# Patient Record
Sex: MALE | Race: WHITE | NOT HISPANIC OR LATINO | URBAN - METROPOLITAN AREA
[De-identification: names, ages, dates, MRNs, and addresses within clinical notes are randomized per-mention and may not be internally consistent; named-entity substitution may affect disease eponyms.]

---

## 2020-05-13 ENCOUNTER — EMERGENCY (EMERGENCY)
Facility: HOSPITAL | Age: 35
LOS: 0 days | Discharge: HOME | End: 2020-05-13
Attending: EMERGENCY MEDICINE | Admitting: EMERGENCY MEDICINE
Payer: OTHER MISCELLANEOUS

## 2020-05-13 VITALS
DIASTOLIC BLOOD PRESSURE: 80 MMHG | HEART RATE: 100 BPM | RESPIRATION RATE: 18 BRPM | SYSTOLIC BLOOD PRESSURE: 142 MMHG | OXYGEN SATURATION: 100 % | TEMPERATURE: 98 F
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
## 2020-05-13 DIAGNOSIS — S82.892A OTHER FRACTURE OF LEFT LOWER LEG, INITIAL ENCOUNTER FOR CLOSED FRACTURE: ICD-10-CM
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
## 2020-05-13 DIAGNOSIS — S92.352A DISPLACED FRACTURE OF FIFTH METATARSAL BONE, LEFT FOOT, INITIAL ENCOUNTER FOR CLOSED FRACTURE: ICD-10-CM

## 2020-05-13 DIAGNOSIS — M25.579 PAIN IN UNSPECIFIED ANKLE AND JOINTS OF UNSPECIFIED FOOT: ICD-10-CM

## 2020-05-13 PROCEDURE — 99284 EMERGENCY DEPT VISIT MOD MDM: CPT | Mod: 25

## 2020-05-13 PROCEDURE — 73610 X-RAY EXAM OF ANKLE: CPT | Mod: 26,LT

## 2020-05-13 PROCEDURE — 29125 APPL SHORT ARM SPLINT STATIC: CPT

## 2020-05-13 PROCEDURE — 73630 X-RAY EXAM OF FOOT: CPT | Mod: 26,LT

## 2020-05-13 RX ORDER — IBUPROFEN 200 MG
600 TABLET ORAL ONCE
Refills: 0 | Status: COMPLETED | OUTPATIENT
Start: 2020-05-13 | End: 2020-05-13

## 2020-05-13 RX ADMIN — Medication 600 MILLIGRAM(S): at 10:47

## 2020-05-13 NOTE — ED PROVIDER NOTE - OBJECTIVE STATEMENT
34 year old male no past medical history p/w l;eft ankle injury. Pain left outer ankle and foot, non-radiating, unable to ambulate moderate, worse with wt bearing. better with rest. 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 no head truama or other injury. Denies erythema, ecchymosis, numbness, weakness, or paresthesia

## 2020-05-13 NOTE — ED PROVIDER NOTE - ATTENDING CONTRIBUTION TO CARE
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 denies fever, chills, n/v, numbness/tingling, decreased sensation, hearing a click or feeling a pop, knee pain, hip pain, lacerations, abrasions, ecchymoses  Vital Signs: I have reviewed the initial vital signs. Constitutional: WDWN in nad. Sitting on stretcher in nad.  Integumentary: No rash. No lacerations, abrasions, ecchymoses. (+) L ankle swelling. Cardiovascular: DP and PT pulses 2/4 b/l. Musculoskeletal: ROM of L knee in flexion, extension, Decreased ROM of L ankle in inversion and eversion, pain worse with inversion. Able to plantar and dorsi flex. Pain to palpation to L lateral ankle, (-) edema, no calf pain/swelling/erythema. No pain to palpation to knee, or hips, no pain to palpation to patella, fibular head, of base of th metatarsals. (-) Anterior and Posterior drawer tests. (-) Mccormick test. No edema, no calf pain/swelling/erythema. Neurologic: AAOx3, motor 5/5 and sensation intact throughout upper and lowe ext, pt able to ambualte but with pain. 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 denies fever, chills, n/v, numbness/tingling, decreased sensation, knee pain, hip pain, lacerations, abrasions, head trauma, or loc.  no seizure like activity.    Vital Signs: I have reviewed the initial vital signs. Constitutional: WDWN in nad. Sitting on stretcher.  Integumentary: No rash. No lacerations, abrasions, ecchymoses. (+) L medial and lateral ankle swelling, worse to lateral mallelous. Cardiovascular: DP and PT pulses 2/4 b/l. Musculoskeletal: ROM of L knee in flexion, extension, Decreased ROM of L ankle in inversion and eversion, pain worse with inversion. Able to plantar and dorsi flex. Pain to palpation to L lateral ankle and bse of L 5th metatarsal, (-) edema, no calf pain/swelling/erythema. No pain to palpation to knee, or hips, no pain to palpation to patella, fibular head, Neurologic: AAOx3, motor 5/5 and sensation intact throughout upper and lower ext.

## 2020-05-13 NOTE — ED PROVIDER NOTE - CARE PLAN
Assessment and plan of treatment:	Plan: Pain control, L ankle and foot xrays, reassess. Principal Discharge DX:	Ankle fracture, left  Assessment and plan of treatment:	Plan: Pain control, L ankle and foot xrays, reassess.  Secondary Diagnosis:	Metatarsal fracture

## 2020-05-13 NOTE — ED PROVIDER NOTE - PROGRESS NOTE DETAILS
pt placed in splint, neurovascular intact, aware of proper splint care, use of crutches, signs and symptoms to return for, follow up with ortho.

## 2020-05-13 NOTE — ED PROVIDER NOTE - NS ED ROS FT
Review of Systems         Constitutional: (-) fever (-) chills (-) weakness       Head: (-) trauma       EENT: (-) visual changes (-) sore throat (-) congestion       Cardiovascular: (-) chest pain (-) syncope       Respiratory: (-) cough, (-) shortness of breath       Gastrointestinal: (-) abdominal pain (-) vomiting (-) diarrhea (-) nausea (-) constipation       Genitourinary: (-) dysuria (-) frequency (-) hematuria       Musculoskeletal: (-) neck pain (-) back pain (+) joint pain       Integumentary: (-) rash       Neurological: (-) headache (-) altered mental status (-) dizziness (-) paresthesias       Allergic/Immunologic: (-) pruritus       Psych: (-) psych history

## 2020-05-13 NOTE — ED PROVIDER NOTE - PHYSICAL EXAMINATION
Physical Exam    Vital Signs: I have reviewed the initial vital signs  Constitutional: well-nourished, appears stated age, no acute distress  EENT: Conjunctiva pink, Sclera clear, PERRLA, EOMI. Mucous membranes moist, no exudates or lesions noted, uvula midline.  Cardiovascular: S1 and S2 present, regular rate, regular rhythm. Well perfused extremities, no peripheral edema  Respiratory: unlabored respiratory effort, clear to auscultation bilaterally no wheezing, rales or rhonchi  Musculoskeletal: supple nontender neck, no midline tenderness, no joint pain. Left Foot: Dp 2 +, sensation intact throughout foot, no knee or tibial pain. obvious lateral aspect of ankle deformity with ttp over lateral mall and 5th metatarsal. minimal plantar dorsiflexion d/t pain but achilles intact.  Integumentary: warm, dry, no rash  Psychiatric: appropriate mood, appropriate affect

## 2020-05-13 NOTE — ED PROVIDER NOTE - CLINICAL SUMMARY MEDICAL DECISION MAKING FREE TEXT BOX
pt aware of fracture, splint care, proper use of crutches, using in ed, signs and symptoms to return for, follow up with ortho, reports will follow up.

## 2020-05-13 NOTE — ED PROVIDER NOTE - NSFOLLOWUPCLINICS_GEN_ALL_ED_FT
[facility]  Orthpedic  [address], [place]   Phone: [phone]  Fax:   Follow Up Time:

## 2020-05-13 NOTE — ED PROVIDER NOTE - CARE PROVIDER_API CALL
Cesar Gardiner)  Orthopaedic Surgery  [address] [place] [address]  Phone: [phone]  Fax: [phone]  Follow Up Time:     Peter Carvajal)  Orthopaedic Surgery  [address] [place] [id number]  Phone: [phone]  Fax: [phone]  Follow Up Time:

## 2020-05-13 NOTE — ED ADULT NURSE NOTE - OBJECTIVE STATEMENT
pt c.o. 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

## 2020-05-13 NOTE — ED PROVIDER NOTE - NSFOLLOWUPINSTRUCTIONS_ED_ALL_ED_FT
-Follow up with your Primary Care Provider in 1-3 days  -RICE (rest, ice compression, elevation) for your injuries; see below  -Take 400-800 mg of Ibuprofen every 6-8 hours as needed for pain with food; food first, then medicine  -Return to ED for worsening symptoms or concerns.    RICE for Routine Care of Injuries  The routine care of many injuries includes rest, ice, compression, and elevation (RICE therapy). RICE therapy is often recommended for injuries to soft tissues, such as a muscle strain, ligament injuries, bruises, and overuse injuries. It can also be used for some bony injuries. Using RICE therapy can help to relieve pain, lessen swelling, and enable your body to heal.    Rest  Rest is required to allow your body to heal. This usually involves reducing your normal activities and avoiding use of the injured part of your body. Generally, you can return to your normal activities when you are comfortable and have been given permission by your health care provider.    Ice  Image   Icing your injury helps to keep the swelling down, and it lessens pain. Do not apply ice directly to your skin.    Put ice in a plastic bag.  Place a towel between your skin and the bag.  Leave the ice on for 20 minutes, 2–3 times a day.    Do this for as long as you are directed by your health care provider.    Compression  Compression means putting pressure on the injured area. Compression helps to keep swelling down, gives support, and helps with discomfort. Compression may be done with an elastic bandage. If an elastic bandage has been applied, follow these general tips:    Remove and reapply the bandage every 3–4 hours or as directed by your health care provider.  Make sure the bandage is not wrapped too tightly, because this can cut off circulation. If part of your body beyond the bandage becomes blue, numb, cold, swollen, or more painful, your bandage is most likely too tight. If this occurs, remove your bandage and reapply it more loosely.  See your health care provider if the bandage seems to be making your problems worse rather than better.    Elevation  Elevation means keeping the injured area raised. This helps to lessen swelling and decrease pain. If possible, your injured area should be elevated at or above the level of your heart or the center of your chest.    When should I seek medical care?  If your pain and swelling continue.  If your symptoms are getting worse rather than improving.  These symptoms may indicate that further evaluation or further X-rays are needed. Sometimes, X-rays may not show a small broken bone (fracture) until a number of days later. Make a follow-up appointment with your health care provider.    When should I seek immediate medical care?  If you have sudden severe pain at or below the area of your injury.  If you have redness or increased swelling around your injury.  If you have tingling or numbness at or below the area of your injury that does not improve after you     Fracture    A fracture is a break in one of your bones. This can occur from a variety of injuries, especially traumatic ones. Symptoms include pain, bruising, or swelling. Do not use the injured limb. If a fracture is in one of the bones below your waist, do not put weight on that limb unless instructed to do so by your healthcare provider. Crutches or a cane may have been provided. A splint or cast may have been applied by your health care provider. Make sure to keep it dry and follow up with an orthopedist as instructed.    SEEK IMMEDIATE MEDICAL CARE IF YOU HAVE ANY OF THE FOLLOWING SYMPTOMS: numbness, tingling, increasing pain, or weakness in any part of the injured limb.

## 2020-05-13 NOTE — ED PROVIDER NOTE - PATIENT PORTAL LINK FT
You can access the FollowMyHealth Patient Portal offered by [facility] by registering at the following website: [email or web address]. By joining [employer]’s FollowMyHealth portal, you will also be able to view your health information using other applications (apps) compatible with our system.

## 2023-11-25 ENCOUNTER — APPOINTMENT (OUTPATIENT)
Dept: MRI IMAGING | Facility: CLINIC | Age: 38
End: 2023-11-25

## 2023-11-25 ENCOUNTER — APPOINTMENT (OUTPATIENT)
Dept: ORTHOPEDIC SURGERY | Facility: CLINIC | Age: 38
End: 2023-11-25
Payer: OTHER MISCELLANEOUS

## 2023-11-25 VITALS — BODY MASS INDEX: 34.36 KG/M2 | WEIGHT: 240 LBS | HEIGHT: 70 IN

## 2023-11-25 DIAGNOSIS — S33.5XXA SPRAIN OF LIGAMENTS OF LUMBAR SPINE, INITIAL ENCOUNTER: ICD-10-CM

## 2023-11-25 DIAGNOSIS — S43.422A SPRAIN OF LEFT ROTATOR CUFF CAPSULE, INITIAL ENCOUNTER: ICD-10-CM

## 2023-11-25 DIAGNOSIS — S50.02XA CONTUSION OF LEFT ELBOW, INITIAL ENCOUNTER: ICD-10-CM

## 2023-11-25 DIAGNOSIS — Z78.9 OTHER SPECIFIED HEALTH STATUS: ICD-10-CM

## 2023-11-25 PROBLEM — Z00.00 ENCOUNTER FOR PREVENTIVE HEALTH EXAMINATION: Status: ACTIVE | Noted: 2023-11-25

## 2023-11-25 PROCEDURE — 99204 OFFICE O/P NEW MOD 45 MIN: CPT | Mod: ACP

## 2023-11-25 PROCEDURE — 73080 X-RAY EXAM OF ELBOW: CPT | Mod: LT

## 2023-11-25 PROCEDURE — 73030 X-RAY EXAM OF SHOULDER: CPT | Mod: LT

## 2023-11-25 RX ORDER — METHOCARBAMOL 750 MG/1
750 TABLET, FILM COATED ORAL
Qty: 60 | Refills: 0 | Status: ACTIVE | COMMUNITY
Start: 2023-11-25 | End: 1900-01-01

## 2023-11-27 ENCOUNTER — APPOINTMENT (OUTPATIENT)
Dept: MRI IMAGING | Facility: CLINIC | Age: 38
End: 2023-11-27
Payer: OTHER MISCELLANEOUS

## 2023-11-27 PROCEDURE — 73221 MRI JOINT UPR EXTREM W/O DYE: CPT | Mod: LT

## 2023-11-30 ENCOUNTER — APPOINTMENT (OUTPATIENT)
Dept: ORTHOPEDIC SURGERY | Facility: CLINIC | Age: 38
End: 2023-11-30
Payer: OTHER MISCELLANEOUS

## 2023-11-30 VITALS — BODY MASS INDEX: 34.36 KG/M2 | HEIGHT: 70 IN | WEIGHT: 240 LBS

## 2023-11-30 PROCEDURE — J3490M: CUSTOM

## 2023-11-30 PROCEDURE — 20611 DRAIN/INJ JOINT/BURSA W/US: CPT | Mod: LT

## 2023-11-30 PROCEDURE — 99214 OFFICE O/P EST MOD 30 MIN: CPT | Mod: 25

## 2023-11-30 RX ORDER — METHYLPREDNISOLONE 4 MG/1
4 TABLET ORAL
Qty: 1 | Refills: 0 | Status: ACTIVE | COMMUNITY
Start: 2023-11-30 | End: 1900-01-01

## 2024-01-02 ENCOUNTER — RX RENEWAL (OUTPATIENT)
Age: 39
End: 2024-01-02

## 2024-01-12 ENCOUNTER — NON-APPOINTMENT (OUTPATIENT)
Age: 39
End: 2024-01-12

## 2024-01-12 ENCOUNTER — APPOINTMENT (OUTPATIENT)
Dept: ORTHOPEDIC SURGERY | Facility: CLINIC | Age: 39
End: 2024-01-12
Payer: OTHER MISCELLANEOUS

## 2024-01-12 VITALS — BODY MASS INDEX: 34.36 KG/M2 | HEIGHT: 70 IN | WEIGHT: 240 LBS

## 2024-01-12 PROCEDURE — 99214 OFFICE O/P EST MOD 30 MIN: CPT | Mod: 57

## 2024-01-12 NOTE — ASSESSMENT
[FreeTextEntry1] : He has so far failed all conservative measures including therapy corticosteroid injection as well as a steroid pack orally.  He still having night symptoms and is unable to lift even a heavy pot at home without significant discomfort.  The MRI suggests an unstable injury to the biceps anchor as well as the superior labrum and at therefore at this time he qualifies for arthroscopic repair.  We had a long discussion about further treatment options including continued physical therapy and more time but given the lack of improvement I think surgery is the next best option.  We discussed the function of the long head of the biceps and that the options would be biceps tenodesis for cosmetic purposes versus a biceps tenotomy to alleviate the pain and improve the function in the shoulder.  I told him my preference is a tenotomy as the tenodesis is associated with high degree of persistent pain.  Risk benefits and alternatives to arthroscopic SLAP repair with possible biceps tenotomy reviewed in detail.  Postoperative course outlined.  Request authorization for UltraSling for surgery.  The risks and benefits of surgery have been discussed. Risks include but are not limited to bleeding, infection, reaction to anesthesia, injury to blood vessels and nerves, malunion, nonunion, DVT, PE, necessity of repeat surgery, chronic pain, loss of limb and death. The patient understands the risks and agrees with the surgical plan. All questions have been answered. NO WORK AT THIS TIME

## 2024-01-12 NOTE — HISTORY OF PRESENT ILLNESS
[Work related] : work related [Nothing helps with pain getting better] : Nothing helps with pain getting better [Not working due to injury] : Work status: not working due to injury [Dull/Aching] : dull/aching [Constant] : constant [Sleep] : sleep [de-identified] : WC DOI 11/24/23 11/30/23: 36 yo RHD M here for left shoulder and left elbow pain. Pt works as a  for [employer]. While at work in a  on 11/24/23, pt slipped and fell backwards injuring the left shoulder and left elbow. Was seen at [facility] on 11/25/23, had x-rays taken, and left shoulder MRI done. States pain has worsened the last few days and is having dificulty sleeping.   01/12/2024: here for a follow up of the LT shoulder/elbow. States no significant changes since the last visit. Has not started PT.  [] : no [FreeTextEntry1] : Left shoulder, Left elbow [FreeTextEntry3] : 11/24/23 [de-identified] : activity [de-identified] : YONI Powell [de-identified] : x-rays, MRI L shoulder

## 2024-01-12 NOTE — IMAGING
[de-identified] : No swelling, no ecchymosis, no tara deformity Tenderness to palpation over anterior shoulder No instability or tenderness over AC joint ROm limited by pain 5/5 supraspinatus, infraspinatus and subscapularis; there is pain with strength testing Positive Raymundo No anterior or posterior shift, no sulcus sign Negative apprehension Motor and sensory intact distally

## 2024-01-31 ENCOUNTER — RX RENEWAL (OUTPATIENT)
Age: 39
End: 2024-01-31

## 2024-01-31 RX ORDER — DICLOFENAC SODIUM 75 MG/1
75 TABLET, DELAYED RELEASE ORAL
Qty: 60 | Refills: 0 | Status: ACTIVE | COMMUNITY
Start: 2023-11-25 | End: 1900-01-01

## 2024-02-05 ENCOUNTER — APPOINTMENT (OUTPATIENT)
Dept: ORTHOPEDIC SURGERY | Facility: CLINIC | Age: 39
End: 2024-02-05
Payer: OTHER MISCELLANEOUS

## 2024-02-05 DIAGNOSIS — Z72.0 TOBACCO USE: ICD-10-CM

## 2024-02-05 PROCEDURE — 99214 OFFICE O/P EST MOD 30 MIN: CPT

## 2024-02-05 NOTE — ASSESSMENT
[FreeTextEntry1] : Surgical details reviewed again.  He is ready to schedule once approved.  He tells me he cut back to 1 pack a week.  Advised that he stop smoking altogether for the week leading up to surgery and the 2 weeks postoperatively to avoid any perioperative complications.  The risks and benefits of surgery have been discussed. Risks include but are not limited to bleeding, infection, reaction to anesthesia, injury to blood vessels and nerves, malunion, nonunion, DVT, PE, necessity of repeat surgery, chronic pain, loss of limb and death. The patient understands the risks and agrees with the surgical plan. All questions have been answered.

## 2024-02-05 NOTE — IMAGING
[de-identified] : No swelling, no ecchymosis, no tara deformity Tenderness to palpation over anterior shoulder No instability or tenderness over AC joint ROm limited by pain 5-/5 supraspinatus, infraspinatus and subscapularis; there is pain with strength testing Positive Raymundo No anterior or posterior shift, no sulcus sign Negative apprehension Motor and sensory intact distally

## 2024-02-05 NOTE — HISTORY OF PRESENT ILLNESS
[Work related] : work related [Sudden] : sudden [9] : 9 [7] : 7 [Dull/Aching] : dull/aching [Sharp] : sharp [Constant] : constant [Sleep] : sleep [Physical therapy] : physical therapy [Nothing helps with pain getting better] : Nothing helps with pain getting better [Not working due to injury] : Work status: not working due to injury [de-identified] : WC DOI 11/24/23 11/30/23: 38 yo RHD M here for left shoulder and left elbow pain. Pt works as a  for [employer]. While at work in a  on 11/24/23, pt slipped and fell backwards injuring the left shoulder and left elbow. Was seen at [facility] on 11/25/23, had x-rays taken, and left shoulder MRI done. States pain has worsened the last few days and is having dificulty sleeping.   01/12/2024: here for a follow up of the LT shoulder/elbow. States no significant changes since the last visit. Has not started PT.   02/05/24 follow up workers comp left biceps doing pt, pain is worse since last visit ,arm gets stiff with certain movements  [] : no [FreeTextEntry1] : Left shoulder, Left elbow [FreeTextEntry3] : 11/24/23 [de-identified] : activity [de-identified] : YONI Powell [de-identified] : x-rays, MRI L shoulder [de-identified] : pt

## 2024-03-01 ENCOUNTER — NON-APPOINTMENT (OUTPATIENT)
Age: 39
End: 2024-03-01

## 2024-03-04 ENCOUNTER — APPOINTMENT (OUTPATIENT)
Dept: ORTHOPEDIC SURGERY | Facility: CLINIC | Age: 39
End: 2024-03-04
Payer: OTHER MISCELLANEOUS

## 2024-03-04 PROCEDURE — L3670: CPT | Mod: LT

## 2024-03-12 ENCOUNTER — APPOINTMENT (OUTPATIENT)
Age: 39
End: 2024-03-12
Payer: OTHER MISCELLANEOUS

## 2024-03-12 PROCEDURE — 29820 SHO ARTHRS SRG PRTL SYNVCT: CPT | Mod: AS,59,LT

## 2024-03-12 PROCEDURE — 29828 SHO ARTHRS SRG BICP TENODSIS: CPT | Mod: AS,LT

## 2024-03-12 PROCEDURE — 29823 SHO ARTHRS SRG XTNSV DBRDMT: CPT | Mod: AS,59,LT

## 2024-03-12 PROCEDURE — 29828 SHO ARTHRS SRG BICP TENODSIS: CPT | Mod: LT

## 2024-03-12 PROCEDURE — 29823 SHO ARTHRS SRG XTNSV DBRDMT: CPT | Mod: 59,LT

## 2024-03-12 PROCEDURE — 29820 SHO ARTHRS SRG PRTL SYNVCT: CPT | Mod: 59,LT

## 2024-03-18 RX ORDER — OXYCODONE AND ACETAMINOPHEN 5; 325 MG/1; MG/1
5-325 TABLET ORAL
Qty: 30 | Refills: 0 | Status: ACTIVE | COMMUNITY
Start: 2024-03-12 | End: 1900-01-01

## 2024-03-22 ENCOUNTER — APPOINTMENT (OUTPATIENT)
Dept: ORTHOPEDIC SURGERY | Facility: CLINIC | Age: 39
End: 2024-03-22
Payer: OTHER MISCELLANEOUS

## 2024-03-22 ENCOUNTER — NON-APPOINTMENT (OUTPATIENT)
Age: 39
End: 2024-03-22

## 2024-03-22 VITALS — BODY MASS INDEX: 34.36 KG/M2 | HEIGHT: 70 IN | WEIGHT: 240 LBS

## 2024-03-22 PROCEDURE — 99024 POSTOP FOLLOW-UP VISIT: CPT

## 2024-03-22 NOTE — HISTORY OF PRESENT ILLNESS
[Work related] : work related [Sudden] : sudden [9] : 9 [7] : 7 [Dull/Aching] : dull/aching [Sharp] : sharp [Constant] : constant [Sleep] : sleep [Nothing helps with pain getting better] : Nothing helps with pain getting better [Physical therapy] : physical therapy [Not working due to injury] : Work status: not working due to injury [de-identified] : WC DOI 11/24/23 11/30/23: 36 yo RHD M here for left shoulder and left elbow pain. Pt works as a  for [employer]. While at work in a  on 11/24/23, pt slipped and fell backwards injuring the left shoulder and left elbow. Was seen at [facility] on 11/25/23, had x-rays taken, and left shoulder MRI done. States pain has worsened the last few days and is having dificulty sleeping.   01/12/2024: here for a follow up of the LT shoulder/elbow. States no significant changes since the last visit. Has not started PT.   02/05/24 follow up workers comp left biceps doing pt, pain is worse since last visit ,arm gets stiff with certain movements   3/22/24: PO#1 LEFT shoulder DOS 3/12/24; SHOULDER SCOPE GHD, BICEPS TENODESIS [] : no [FreeTextEntry3] : 11/24/23 [de-identified] : activity [de-identified] : x-rays, MRI L shoulder [de-identified] : YONI Powell [de-identified] : 3/12/24 [de-identified] : pt

## 2024-03-22 NOTE — IMAGING
[de-identified] : No erythema or warmth Clean and dry incisions, sutures in place Shoulder immobilizer in place Limited ROM compatible with recent surgery Motor and sensory intact distally

## 2024-03-22 NOTE — ASSESSMENT
[FreeTextEntry1] : DOING WELL DURING THIS FIRST POSTOP VISIT SUTURES REMOVED PRECAUTIONS AND RESTRICTIONS REVIEWED IN DETAIL WE REVIEWED THE INTRAOPERATIVE FINDINGS IN DETAIL (INCLUDING SCOPE PICTURES WHERE APPLICABLE) ALL QUESTIONS ANSWERED NO WORK START PT

## 2024-05-01 ENCOUNTER — APPOINTMENT (OUTPATIENT)
Dept: ORTHOPEDIC SURGERY | Facility: CLINIC | Age: 39
End: 2024-05-01
Payer: OTHER MISCELLANEOUS

## 2024-05-01 VITALS — HEIGHT: 70 IN | BODY MASS INDEX: 34.36 KG/M2 | WEIGHT: 240 LBS

## 2024-05-01 PROCEDURE — 99024 POSTOP FOLLOW-UP VISIT: CPT

## 2024-05-01 NOTE — IMAGING
[de-identified] : Portals healed Forward flexion passive 160, external rotation 70, internal rotation 40 No David deformity Mild tenderness to palpation over lateral shoulder NVID

## 2024-05-01 NOTE — HISTORY OF PRESENT ILLNESS
[Work related] : work related [Sudden] : sudden [9] : 9 [7] : 7 [Dull/Aching] : dull/aching [Sharp] : sharp [Constant] : constant [Sleep] : sleep [Physical therapy] : physical therapy [Nothing helps with pain getting better] : Nothing helps with pain getting better [Not working due to injury] : Work status: not working due to injury [FreeTextEntry1] : L shoulder [] : no [FreeTextEntry3] : 11/24/23 [de-identified] : activity [de-identified] : YONI Powell [de-identified] : x-rays, MRI L shoulder [de-identified] : pt [de-identified] : 3/12/24

## 2024-05-01 NOTE — ASSESSMENT
[FreeTextEntry1] : So far excellent progress clinically.  Continue physical therapy 2 or 3 times a week to restore and range of motion and strength.  Reasonable to return to work.  He tells me he is not required to do any significant amount of lifting initially.  Risk of biceps rupture and reinjury reviewed.  Follow-up in 6 weeks to reassess.

## 2024-05-22 ENCOUNTER — APPOINTMENT (OUTPATIENT)
Dept: ORTHOPEDIC SURGERY | Facility: CLINIC | Age: 39
End: 2024-05-22
Payer: OTHER MISCELLANEOUS

## 2024-05-22 ENCOUNTER — NON-APPOINTMENT (OUTPATIENT)
Age: 39
End: 2024-05-22

## 2024-05-22 VITALS — HEIGHT: 70 IN | WEIGHT: 240 LBS | BODY MASS INDEX: 34.36 KG/M2

## 2024-05-22 PROCEDURE — 99024 POSTOP FOLLOW-UP VISIT: CPT

## 2024-05-22 RX ORDER — METHYLPREDNISOLONE 4 MG/1
4 TABLET ORAL
Qty: 1 | Refills: 0 | Status: ACTIVE | COMMUNITY
Start: 2024-05-22 | End: 1900-01-01

## 2024-05-22 NOTE — IMAGING
[de-identified] : Shoulder Exam:  Inspection: No swelling, no ecchymosis, no tara deformity Palpation: Tenderness to palpation over anterior shoulder;  no instability or tenderness over AC joint Range of Motion testing: Guarded due to pain Strength: There is pain with strength testing Special testing: Positive [place]; no anterior or posterior shift, no sulcus sign; Negative apprehension Motor and sensory intact distally

## 2024-05-22 NOTE — HISTORY OF PRESENT ILLNESS
[Work related] : work related [Sudden] : sudden [9] : 9 [7] : 7 [Dull/Aching] : dull/aching [Sharp] : sharp [Constant] : constant [Sleep] : sleep [Physical therapy] : physical therapy [Nothing helps with pain getting better] : Nothing helps with pain getting better [Not working due to injury] : Work status: not working due to injury [] : no [FreeTextEntry1] : L shoulder [FreeTextEntry3] : 11/24/23 [de-identified] : activity [de-identified] : YONI Powell [de-identified] : x-rays, MRI L shoulder [de-identified] : 3/12/24 [de-identified] : pt

## 2024-05-22 NOTE — ASSESSMENT
[FreeTextEntry1] : He is now 2-1/2 months from shoulder surgery.  Has been back to work but attempted to pull on a rake that ended up getting stuck and felt a ripping sensation in the left shoulder.  We had a long discussion at the last visit about the need to avoid any lifting or any strenuous work for the left shoulder as it is not healed yet.  He is guarded on exam today.  Concern for recurrent tearing or new injury to the biceps.  Will obtain MRI to better evaluate.  Will start him on a steroid pack to alleviate the pain.  Recommend he stay out of work at this time due to risk of further injury.   The patient's current medication management of their orthopedic diagnosis was reviewed today: (1) We discussed a comprehensive treatment plan that included pharmaceutical management involving the use of prescription medications. (2) There is a moderate risk of morbidity with further treatment, especially from use of prescription strength medications and possible side effects of these medications which include upset stomach with oral medications, skin reactions to topical medications and cardiac/renal/diabetes issues with long term use. (3) I recommended that the patient follow-up with their medical physician to discuss any significant specific potential issues with long term medication use such as interactions with current medications or with exacerbation of underlying medical comorbidities. (4) The benefits and risks associated with use of injectable, oral or topical, prescription and over the counter anti-inflammatory medications were discussed with the patient. The patient voiced understanding of the risks including but not limited to bleeding, stroke, kidney dysfunction, heart disease, and were referred to the black box warning label for further information.

## 2024-06-10 ENCOUNTER — APPOINTMENT (OUTPATIENT)
Dept: ORTHOPEDIC SURGERY | Facility: CLINIC | Age: 39
End: 2024-06-10
Payer: OTHER MISCELLANEOUS

## 2024-06-10 ENCOUNTER — NON-APPOINTMENT (OUTPATIENT)
Age: 39
End: 2024-06-10

## 2024-06-10 VITALS — BODY MASS INDEX: 34.36 KG/M2 | HEIGHT: 70 IN | WEIGHT: 240 LBS

## 2024-06-10 DIAGNOSIS — S46.102D UNSPECIFIED INJURY OF MUSCLE, FASCIA AND TENDON OF LONG HEAD OF BICEPS, LEFT ARM, SUBSEQUENT ENCOUNTER: ICD-10-CM

## 2024-06-10 DIAGNOSIS — S43.432D SUPERIOR GLENOID LABRUM LESION OF LEFT SHOULDER, SUBSEQUENT ENCOUNTER: ICD-10-CM

## 2024-06-10 PROCEDURE — 99024 POSTOP FOLLOW-UP VISIT: CPT

## 2024-06-10 RX ORDER — MELOXICAM 15 MG/1
15 TABLET ORAL DAILY
Qty: 30 | Refills: 1 | Status: ACTIVE | COMMUNITY
Start: 2024-06-10 | End: 2024-08-09

## 2024-06-10 NOTE — HISTORY OF PRESENT ILLNESS
[Work related] : work related [Sudden] : sudden [9] : 9 [7] : 7 [Dull/Aching] : dull/aching [Sharp] : sharp [Constant] : constant [Sleep] : sleep [Physical therapy] : physical therapy [Nothing helps with pain getting better] : Nothing helps with pain getting better [Not working due to injury] : Work status: not working due to injury [de-identified] : 6.10.24 Patient here for left shoulder pain. here to review MRI [] : no [FreeTextEntry1] : L shoulder [FreeTextEntry3] : 11/24/23 [de-identified] : activity [de-identified] : YONI Powell [de-identified] : x-rays, MRI L shoulder [de-identified] : pt [de-identified] : 3/12/24

## 2024-06-10 NOTE — ASSESSMENT
[FreeTextEntry1] : Reviewed MRI in detail.  Bone bruising contusion over the greater tuberosity with postsurgical changes after biceps tenodesis.  There is significant attenuation of the proximal long head of the biceps indicating at least a partial retear.  There is still tendon within the groove indicating that this did not pop off from the prior tenodesis but definitely became attenuated.  He will remain out of work at this time and will return to physical therapy until this is further consolidated and healed.  There is possibility that this tear is further and he will be faced with a permanent biceps deformity or David deformity.  Follow-up in 6 weeks to reassess.  He will be started on meloxicam prescription for the pain.   The patient's current medication management of their orthopedic diagnosis was reviewed today: (1) We discussed a comprehensive treatment plan that included pharmaceutical management involving the use of prescription medications. (2) There is a moderate risk of morbidity with further treatment, especially from use of prescription strength medications and possible side effects of these medications which include upset stomach with oral medications, skin reactions to topical medications and cardiac/renal/diabetes issues with long term use. (3) I recommended that the patient follow-up with their medical physician to discuss any significant specific potential issues with long term medication use such as interactions with current medications or with exacerbation of underlying medical comorbidities. (4) The benefits and risks associated with use of injectable, oral or topical, prescription and over the counter anti-inflammatory medications were discussed with the patient. The patient voiced understanding of the risks including but not limited to bleeding, stroke, kidney dysfunction, heart disease, and were referred to the black box warning label for further information.

## 2024-06-19 ENCOUNTER — APPOINTMENT (OUTPATIENT)
Dept: ORTHOPEDIC SURGERY | Facility: CLINIC | Age: 39
End: 2024-06-19

## 2024-07-22 ENCOUNTER — APPOINTMENT (OUTPATIENT)
Dept: ORTHOPEDIC SURGERY | Facility: CLINIC | Age: 39
End: 2024-07-22
Payer: OTHER MISCELLANEOUS

## 2024-07-22 ENCOUNTER — NON-APPOINTMENT (OUTPATIENT)
Age: 39
End: 2024-07-22

## 2024-07-22 VITALS — BODY MASS INDEX: 35.55 KG/M2 | HEIGHT: 69 IN | WEIGHT: 240 LBS

## 2024-07-22 DIAGNOSIS — S43.432D SUPERIOR GLENOID LABRUM LESION OF LEFT SHOULDER, SUBSEQUENT ENCOUNTER: ICD-10-CM

## 2024-07-22 DIAGNOSIS — S46.102D UNSPECIFIED INJURY OF MUSCLE, FASCIA AND TENDON OF LONG HEAD OF BICEPS, LEFT ARM, SUBSEQUENT ENCOUNTER: ICD-10-CM

## 2024-07-22 PROCEDURE — 99214 OFFICE O/P EST MOD 30 MIN: CPT

## 2024-07-22 NOTE — ASSESSMENT
[FreeTextEntry1] : He reports minimal improvement with physical therapy.  He will be continued on the meloxicam and additional physical therapy is advised.  If no improvement in 6 weeks we discussed revision arthroscopy with biceps tenotomy and further debridement along the groove.  Explained risk of permanent deformity.  Remains out of work at this time due to risk of new injury.

## 2024-07-22 NOTE — HISTORY OF PRESENT ILLNESS
[Work related] : work related [Sudden] : sudden [9] : 9 [7] : 7 [Dull/Aching] : dull/aching [Sharp] : sharp [Constant] : constant [Sleep] : sleep [Physical therapy] : physical therapy [Nothing helps with pain getting better] : Nothing helps with pain getting better [Not working due to injury] : Work status: not working due to injury [de-identified] : 6.10.24 Patient here for left shoulder pain. here to review MRI  7.22.24 Patient here for left shoulder pain. Patient states he still has pain in the shoulder [] : no [FreeTextEntry1] : L shoulder [FreeTextEntry3] : 11/24/23 [de-identified] : activity [de-identified] : x-rays, MRI L shoulder [de-identified] : YONI Powell [de-identified] : pt [de-identified] : 3/12/24

## 2024-07-22 NOTE — IMAGING
[de-identified] : Shoulder Exam:  Inspection: No swelling, no ecchymosis, no tara deformity Palpation: Tenderness to palpation over anterior shoulder;  no instability or tenderness over AC joint Range of Motion testing: Guarded due to pain Strength: There is pain with strength testing Special testing: Positive Joy; no anterior or posterior shift, no sulcus sign; Negative apprehension Motor and sensory intact distally

## 2024-09-09 ENCOUNTER — APPOINTMENT (OUTPATIENT)
Dept: ORTHOPEDIC SURGERY | Facility: CLINIC | Age: 39
End: 2024-09-09
Payer: OTHER MISCELLANEOUS

## 2024-09-09 ENCOUNTER — APPOINTMENT (OUTPATIENT)
Dept: NEUROLOGY | Facility: CLINIC | Age: 39
End: 2024-09-09

## 2024-09-09 DIAGNOSIS — S46.102D UNSPECIFIED INJURY OF MUSCLE, FASCIA AND TENDON OF LONG HEAD OF BICEPS, LEFT ARM, SUBSEQUENT ENCOUNTER: ICD-10-CM

## 2024-09-09 DIAGNOSIS — S43.432D SUPERIOR GLENOID LABRUM LESION OF LEFT SHOULDER, SUBSEQUENT ENCOUNTER: ICD-10-CM

## 2024-09-09 PROCEDURE — 99213 OFFICE O/P EST LOW 20 MIN: CPT

## 2024-09-09 NOTE — HISTORY OF PRESENT ILLNESS
[Work related] : work related [Sudden] : sudden [7] : 7 [Dull/Aching] : dull/aching [Sharp] : sharp [Constant] : constant [Sleep] : sleep [Physical therapy] : physical therapy [Nothing helps with pain getting better] : Nothing helps with pain getting better [Not working due to injury] : Work status: not working due to injury [de-identified] : 6.10.24 Patient here for left shoulder pain. here to review MRI  7.22.24 Patient here for left shoulder pain. Patient states he still has pain in the shoulder  9.9.24 Patient here for left shoulder pain. Patient states he still has pain in the shoulder during activity   [] : no [FreeTextEntry1] : L shoulder [FreeTextEntry3] : 11/24/23 [de-identified] : activity [de-identified] : YONI Powell [de-identified] : x-rays, MRI L shoulder [de-identified] : pt [de-identified] : 3/12/24

## 2024-09-09 NOTE — IMAGING
[de-identified] : Shoulder Exam:  Inspection: No swelling, no ecchymosis, no tara deformity Palpation: Tenderness to palpation over anterior shoulder;  no instability or tenderness over AC joint Range of Motion testing: Guarded due to pain Strength: There is pain with strength testing Special testing: Positive [place]; no anterior or posterior shift, no sulcus sign; Negative apprehension Motor and sensory intact distally

## 2024-09-09 NOTE — ASSESSMENT
[FreeTextEntry1] : Clinically improving left shoulder pain after a round of physical therapy. Kindly request additional physical therapy as he transitions back to work to improve strength and range of motion. He will continue meloxicam as needed. Again, briefly discussed surgical intervention if symptoms persist. He will RTW full duty on 9/23/24. RTC 6 weeks.

## 2024-09-13 NOTE — ED PROVIDER NOTE - NSDCPRINTRESULTS_ED_ALL_ED
Is This A New Presentation, Or A Follow-Up?: Phototherapy Treatment Patient requests all Lab and Radiology Results on their Discharge Instructions

## 2024-10-21 ENCOUNTER — NON-APPOINTMENT (OUTPATIENT)
Age: 39
End: 2024-10-21

## 2024-10-21 ENCOUNTER — APPOINTMENT (OUTPATIENT)
Dept: ORTHOPEDIC SURGERY | Facility: CLINIC | Age: 39
End: 2024-10-21
Payer: OTHER MISCELLANEOUS

## 2024-10-21 DIAGNOSIS — S43.432D SUPERIOR GLENOID LABRUM LESION OF LEFT SHOULDER, SUBSEQUENT ENCOUNTER: ICD-10-CM

## 2024-10-21 DIAGNOSIS — S46.102D UNSPECIFIED INJURY OF MUSCLE, FASCIA AND TENDON OF LONG HEAD OF BICEPS, LEFT ARM, SUBSEQUENT ENCOUNTER: ICD-10-CM

## 2024-10-21 DIAGNOSIS — S43.422A SPRAIN OF LEFT ROTATOR CUFF CAPSULE, INITIAL ENCOUNTER: ICD-10-CM

## 2024-10-21 PROCEDURE — 73010 X-RAY EXAM OF SHOULDER BLADE: CPT | Mod: LT

## 2024-10-21 PROCEDURE — 99213 OFFICE O/P EST LOW 20 MIN: CPT

## 2024-10-21 PROCEDURE — 73030 X-RAY EXAM OF SHOULDER: CPT | Mod: LT

## 2024-12-02 ENCOUNTER — NON-APPOINTMENT (OUTPATIENT)
Age: 39
End: 2024-12-02

## 2024-12-02 ENCOUNTER — APPOINTMENT (OUTPATIENT)
Dept: ORTHOPEDIC SURGERY | Facility: CLINIC | Age: 39
End: 2024-12-02
Payer: OTHER MISCELLANEOUS

## 2024-12-02 DIAGNOSIS — S43.422A SPRAIN OF LEFT ROTATOR CUFF CAPSULE, INITIAL ENCOUNTER: ICD-10-CM

## 2024-12-02 PROCEDURE — 99213 OFFICE O/P EST LOW 20 MIN: CPT

## 2024-12-02 RX ORDER — MELOXICAM 15 MG/1
15 TABLET ORAL DAILY
Qty: 30 | Refills: 1 | Status: ACTIVE | COMMUNITY
Start: 2024-12-02 | End: 2025-01-31

## 2024-12-09 ENCOUNTER — NON-APPOINTMENT (OUTPATIENT)
Age: 39
End: 2024-12-09

## 2024-12-09 ENCOUNTER — APPOINTMENT (OUTPATIENT)
Dept: ORTHOPEDIC SURGERY | Facility: CLINIC | Age: 39
End: 2024-12-09
Payer: OTHER MISCELLANEOUS

## 2024-12-09 VITALS — HEIGHT: 69 IN | WEIGHT: 240 LBS | BODY MASS INDEX: 35.55 KG/M2

## 2024-12-09 DIAGNOSIS — S46.102D UNSPECIFIED INJURY OF MUSCLE, FASCIA AND TENDON OF LONG HEAD OF BICEPS, LEFT ARM, SUBSEQUENT ENCOUNTER: ICD-10-CM

## 2024-12-09 DIAGNOSIS — S43.432D SUPERIOR GLENOID LABRUM LESION OF LEFT SHOULDER, SUBSEQUENT ENCOUNTER: ICD-10-CM

## 2024-12-09 PROCEDURE — 99213 OFFICE O/P EST LOW 20 MIN: CPT

## 2024-12-16 ENCOUNTER — APPOINTMENT (OUTPATIENT)
Dept: ORTHOPEDIC SURGERY | Facility: CLINIC | Age: 39
End: 2024-12-16

## 2024-12-30 ENCOUNTER — APPOINTMENT (OUTPATIENT)
Dept: ORTHOPEDIC SURGERY | Facility: CLINIC | Age: 39
End: 2024-12-30
Payer: OTHER MISCELLANEOUS

## 2024-12-30 ENCOUNTER — NON-APPOINTMENT (OUTPATIENT)
Age: 39
End: 2024-12-30

## 2024-12-30 DIAGNOSIS — S43.432D SUPERIOR GLENOID LABRUM LESION OF LEFT SHOULDER, SUBSEQUENT ENCOUNTER: ICD-10-CM

## 2024-12-30 PROCEDURE — 99213 OFFICE O/P EST LOW 20 MIN: CPT

## 2025-01-31 ENCOUNTER — APPOINTMENT (OUTPATIENT)
Dept: ORTHOPEDIC SURGERY | Facility: CLINIC | Age: 40
End: 2025-01-31
Payer: OTHER MISCELLANEOUS

## 2025-01-31 VITALS — BODY MASS INDEX: 35.55 KG/M2 | HEIGHT: 69 IN | WEIGHT: 240 LBS

## 2025-01-31 DIAGNOSIS — S50.02XA CONTUSION OF LEFT ELBOW, INITIAL ENCOUNTER: ICD-10-CM

## 2025-01-31 DIAGNOSIS — S46.102D UNSPECIFIED INJURY OF MUSCLE, FASCIA AND TENDON OF LONG HEAD OF BICEPS, LEFT ARM, SUBSEQUENT ENCOUNTER: ICD-10-CM

## 2025-01-31 DIAGNOSIS — S43.432D SUPERIOR GLENOID LABRUM LESION OF LEFT SHOULDER, SUBSEQUENT ENCOUNTER: ICD-10-CM

## 2025-01-31 DIAGNOSIS — M77.12 LATERAL EPICONDYLITIS, LEFT ELBOW: ICD-10-CM

## 2025-01-31 PROCEDURE — 99213 OFFICE O/P EST LOW 20 MIN: CPT

## 2025-01-31 RX ORDER — MELOXICAM 15 MG/1
15 TABLET ORAL DAILY
Qty: 30 | Refills: 1 | Status: ACTIVE | COMMUNITY
Start: 2025-01-31 | End: 2025-04-01

## 2025-02-10 ENCOUNTER — APPOINTMENT (OUTPATIENT)
Dept: ORTHOPEDIC SURGERY | Facility: CLINIC | Age: 40
End: 2025-02-10

## 2025-02-13 ENCOUNTER — APPOINTMENT (OUTPATIENT)
Dept: ORTHOPEDIC SURGERY | Facility: CLINIC | Age: 40
End: 2025-02-13
Payer: OTHER MISCELLANEOUS

## 2025-02-13 VITALS — BODY MASS INDEX: 35.55 KG/M2 | WEIGHT: 240 LBS | HEIGHT: 69 IN

## 2025-02-13 DIAGNOSIS — S43.432D SUPERIOR GLENOID LABRUM LESION OF LEFT SHOULDER, SUBSEQUENT ENCOUNTER: ICD-10-CM

## 2025-02-13 DIAGNOSIS — M77.12 LATERAL EPICONDYLITIS, LEFT ELBOW: ICD-10-CM

## 2025-02-13 PROCEDURE — 99213 OFFICE O/P EST LOW 20 MIN: CPT

## 2025-03-24 ENCOUNTER — APPOINTMENT (OUTPATIENT)
Dept: ORTHOPEDIC SURGERY | Facility: CLINIC | Age: 40
End: 2025-03-24

## 2025-06-25 ENCOUNTER — APPOINTMENT (OUTPATIENT)
Dept: ORTHOPEDIC SURGERY | Facility: CLINIC | Age: 40
End: 2025-06-25
Payer: OTHER MISCELLANEOUS

## 2025-06-25 VITALS — WEIGHT: 240 LBS | BODY MASS INDEX: 35.55 KG/M2 | HEIGHT: 69 IN

## 2025-06-25 PROCEDURE — 99213 OFFICE O/P EST LOW 20 MIN: CPT

## 2025-06-25 RX ORDER — NAPROXEN 500 MG/1
500 TABLET ORAL
Qty: 30 | Refills: 1 | Status: ACTIVE | COMMUNITY
Start: 2025-06-25 | End: 1900-01-01

## 2025-08-06 ENCOUNTER — APPOINTMENT (OUTPATIENT)
Dept: ORTHOPEDIC SURGERY | Facility: CLINIC | Age: 40
End: 2025-08-06
Payer: OTHER MISCELLANEOUS

## 2025-08-06 VITALS — BODY MASS INDEX: 35.55 KG/M2 | WEIGHT: 240 LBS | HEIGHT: 69 IN

## 2025-08-06 PROCEDURE — 99213 OFFICE O/P EST LOW 20 MIN: CPT

## 2025-08-14 ENCOUNTER — APPOINTMENT (OUTPATIENT)
Dept: ORTHOPEDIC SURGERY | Facility: CLINIC | Age: 40
End: 2025-08-14
Payer: OTHER MISCELLANEOUS

## 2025-08-14 DIAGNOSIS — M77.12 LATERAL EPICONDYLITIS, LEFT ELBOW: ICD-10-CM

## 2025-08-14 DIAGNOSIS — S50.02XA CONTUSION OF LEFT ELBOW, INITIAL ENCOUNTER: ICD-10-CM

## 2025-08-14 DIAGNOSIS — S46.102D UNSPECIFIED INJURY OF MUSCLE, FASCIA AND TENDON OF LONG HEAD OF BICEPS, LEFT ARM, SUBSEQUENT ENCOUNTER: ICD-10-CM

## 2025-08-14 PROCEDURE — 20551 NJX 1 TENDON ORIGIN/INSJ: CPT | Mod: LT

## 2025-08-14 PROCEDURE — J3490M: CUSTOM

## 2025-08-14 PROCEDURE — 99213 OFFICE O/P EST LOW 20 MIN: CPT | Mod: 25

## 2025-09-05 ENCOUNTER — RESULT REVIEW (OUTPATIENT)
Age: 40
End: 2025-09-05

## 2025-09-08 ENCOUNTER — APPOINTMENT (OUTPATIENT)
Dept: ORTHOPEDIC SURGERY | Facility: CLINIC | Age: 40
End: 2025-09-08
Payer: OTHER MISCELLANEOUS

## 2025-09-08 DIAGNOSIS — M77.12 LATERAL EPICONDYLITIS, LEFT ELBOW: ICD-10-CM

## 2025-09-08 PROBLEM — M54.12 CERVICAL RADICULITIS: Status: ACTIVE | Noted: 2025-09-08

## 2025-09-08 PROCEDURE — 99214 OFFICE O/P EST MOD 30 MIN: CPT

## 2025-09-12 ENCOUNTER — NON-APPOINTMENT (OUTPATIENT)
Age: 40
End: 2025-09-12

## 2025-09-12 ENCOUNTER — APPOINTMENT (OUTPATIENT)
Dept: ORTHOPEDIC SURGERY | Facility: CLINIC | Age: 40
End: 2025-09-12
Payer: OTHER MISCELLANEOUS

## 2025-09-12 VITALS — WEIGHT: 240 LBS | HEIGHT: 69 IN | BODY MASS INDEX: 35.55 KG/M2

## 2025-09-12 DIAGNOSIS — M54.12 RADICULOPATHY, CERVICAL REGION: ICD-10-CM

## 2025-09-12 PROCEDURE — 99214 OFFICE O/P EST MOD 30 MIN: CPT

## 2025-09-12 PROCEDURE — 72050 X-RAY EXAM NECK SPINE 4/5VWS: CPT

## 2025-09-12 RX ORDER — GABAPENTIN 300 MG/1
300 CAPSULE ORAL
Qty: 30 | Refills: 2 | Status: ACTIVE | COMMUNITY
Start: 2025-09-12 | End: 1900-01-01

## 2025-09-17 ENCOUNTER — NON-APPOINTMENT (OUTPATIENT)
Age: 40
End: 2025-09-17